# Patient Record
Sex: FEMALE | Race: OTHER
[De-identification: names, ages, dates, MRNs, and addresses within clinical notes are randomized per-mention and may not be internally consistent; named-entity substitution may affect disease eponyms.]

---

## 2021-05-17 ENCOUNTER — HOSPITAL ENCOUNTER (EMERGENCY)
Dept: HOSPITAL 61 - ER | Age: 41
Discharge: HOME | End: 2021-05-17
Payer: SELF-PAY

## 2021-05-17 VITALS — DIASTOLIC BLOOD PRESSURE: 79 MMHG | SYSTOLIC BLOOD PRESSURE: 126 MMHG

## 2021-05-17 VITALS — BODY MASS INDEX: 27.59 KG/M2 | WEIGHT: 149.91 LBS | HEIGHT: 62 IN

## 2021-05-17 DIAGNOSIS — E11.9: ICD-10-CM

## 2021-05-17 DIAGNOSIS — S61.213A: Primary | ICD-10-CM
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
## 2021-05-17 PROCEDURE — 99283 EMERGENCY DEPT VISIT LOW MDM: CPT

## 2021-05-17 NOTE — PHYS DOC
Past Medical History


Past Medical History:  Diabetes-Type II


 (ELAINA WATTS RYAN APRN)


Past Surgical History:  No Surgical History


 (ELAINA WATTS RYAN APRN)


Smoking Status:  Current Every Day Smoker


Alcohol Use:  None


 (ELAINA WATTS APRN)





General Adult


EDM:


Chief Complaint:  LACERATION/AVULSION





HPI:


HPI:





Patient is a 40  year old female presented to the ED today with left middle 

finger laceration that occurred yesterday.  Patient states her tetanus is 

up-to-date.  She states she cut herself accidentally with a knife.  Patient is 

right-handed.


 (ELAINA WATTS APRN)





Review of Systems:


Review of Systems:


Constitutional:   Denies fever or chills. []


Musculoskeletal:   Denies back pain or joint pain. [] 


Integument: Reports left middle finger laceration


Neurologic:   Denies headache, focal weakness or sensory changes. [] 


Psychiatric:  Denies depression or anxiety. []


 (ELAINA WATTS RYAN APRN)





Heart Score:


C/O Chest Pain:  N/A


Risk Factors:


Risk Factors:  DM, Current or recent (<one month) smoker, HTN, HLP, family 

history of CAD, obesity.


Risk Scores:


Score 0 - 3:  2.5% MACE over next 6 weeks - Discharge Home


Score 4 - 6:  20.3% MACE over next 6 weeks - Admit for Clinical Observation


Score 7 - 10:  72.7% MACE over next 6 weeks - Early Invasive Strategies


 (ELAINA WATTS RYAN APRN)





Allergies:


Allergies:





Allergies








Coded Allergies Type Severity Reaction Last Updated Verified


 


  Penicillins Allergy Unknown  5/17/21 Yes


 


  cephalexin Allergy Unknown  5/17/21 Yes








 (ELAINA WATTS RYAN APRN)





Physical Exam:


PE:





Constitutional: Well developed, well nourished, no acute distress, non-toxic 

appearance. []


Skin: Left middle finger radial aspect with a laceration approximately 3 cm long

on the distal end of the finger.  There is no obvious tendon involvement.  

Laceration appears old and has crusting around it.  Full range of motion to the 

left middle finger.  Adequate radial, median sensation to the left middle 

finger.  +2 left radial pulse.  Cap refill less than 2 seconds the left middle 

finger


Back: No tenderness, no CVA tenderness. [] 


Extremities: No tenderness, no cyanosis, no clubbing, ROM intact, no edema. [] 


Neurologic: Alert and oriented X 3, normal motor function, normal sensory 

function, no focal deficits noted. []


Psychologic: Affect normal, judgement normal, mood normal. []


 (ELAINA WATTS)





Current Patient Data:


Vital Signs:





                                   Vital Signs








  Date Time  Temp Pulse Resp B/P (MAP) Pulse Ox O2 Delivery O2 Flow Rate FiO2


 


5/17/21 17:26 98.2 97 12 126/79 (95) 99 Room Air  





 98.2       








 (ELAINA WATTS)





EKG:


EKG:


[]


 (ELAINA WATTS)





Radiology/Procedures:


Radiology/Procedures:


[]


 (ELAINA WATTS)





Course & Med Decision Making:


Course & Med Decision Making


Pertinent Labs and Imaging studies reviewed. (See chart for details)





This is a 40-year-old female patient presenting to the ED today with left middle

 finger laceration that occurred yesterday.  Laceration is already dried up and 

cannot be closed.  It will be allowed to heal by secondary intention.  Tetanus 

is up-to-date.  Discharged on Bactrim, wound care instructions provided








 (ELAINA WATTS)


Course & Med Decision Making


The patient was seen and interviewed as well as examined at the bedside. The 

chart was reviewed. The case was discussed. Agree with the plan of care.


 (HERSON CASTILLO DO)


Dragon Disclaimer:


Dragon Disclaimer:


This electronic medical record was generated, in whole or in part, using a voice

 recognition dictation system.


 (ELAINA WATTS)





Departure


Departure


Impression:  


   Primary Impression:  


   Finger laceration


   Qualified Codes:  S61.213A - Laceration without foreign body of left middle 

   finger without damage to nail, initial encounter


Disposition:  01 HOME / SELF CARE / HOMELESS


Condition:  STABLE


Referrals:  


NO PCP (PCP)


follow up with your doctor in 1-2 weeks


Patient Instructions:  Fingertip Laceration





Additional Instructions:  


You have laceration to left middle finger.  Wash the area with regular soap and 

water.  Apply Neosporin to the area twice a day.  Take the prescribed 

antibiotics until completed.  Monitor the area for any signs of infection 

including but not limited to increased redness, warmth, yellow drainage from the

 area and return to the ED if they occur


Scripts


Sulfamethoxazole/Trimethoprim (BACTRIM DS TABLET) 1 Each Tablet


1 TAB PO BID for 10 Days, #20 TAB 0 Refills


   Prov: ELAINA WATTS         5/17/21











ELAINA WATTS            May 17, 2021 18:28


HERSON CASTILLO DO            May 19, 2021 19:05

## 2021-10-01 ENCOUNTER — HOSPITAL ENCOUNTER (EMERGENCY)
Dept: HOSPITAL 61 - ER | Age: 41
Discharge: HOME | End: 2021-10-01
Payer: SELF-PAY

## 2021-10-01 VITALS — WEIGHT: 165.35 LBS | HEIGHT: 62 IN | BODY MASS INDEX: 30.43 KG/M2

## 2021-10-01 VITALS — DIASTOLIC BLOOD PRESSURE: 86 MMHG | SYSTOLIC BLOOD PRESSURE: 160 MMHG

## 2021-10-01 DIAGNOSIS — F17.200: ICD-10-CM
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
## 2021-10-01 DIAGNOSIS — Z88.0: ICD-10-CM

## 2021-10-01 DIAGNOSIS — S05.01XA: Primary | ICD-10-CM

## 2021-10-01 DIAGNOSIS — E11.9: ICD-10-CM

## 2021-10-01 DIAGNOSIS — Z88.1: ICD-10-CM

## 2021-10-01 PROCEDURE — 99283 EMERGENCY DEPT VISIT LOW MDM: CPT

## 2021-10-01 NOTE — PHYS DOC
Past Medical History


Past Medical History:  Diabetes-Type II


Past Surgical History:  No Surgical History


Smoking Status:  Current Every Day Smoker


Alcohol Use:  None





General Adult


EDM:


Chief Complaint:  EYE PROBLEMS





HPI:


HPI:





Patient is a 41  year old female who presents with right eye pain after hitting 

herself with the corner of her car door approximately 3 to 4 hours ago.  Eye has

been watering frequently.  She has a sensation like something is stuck in her 

eye.  She has noticed her nose running on the same side that her eye is 

watering.  Small amount of bruising around the eye.  Did not lose consciousness.

 No confusion.  Denies pain elsewhere.  Not a contact wearer.





Review of Systems:


Review of Systems:


Constitutional:   Denies fever or chills. []


Eyes:   Right eye pain.  No visual change.


HENT:   Denies nasal congestion or sore throat. [] 


Respiratory:   Denies cough or shortness of breath. [] 


Cardiovascular:   Denies chest pain or edema. [] 


GI:   Denies abdominal pain, nausea, vomiting, bloody stools or diarrhea. [] 


:  Denies dysuria. [] 


Musculoskeletal:   Denies back pain or joint pain. [] 


Integument:   Denies rash. [] 


Neurologic:   Denies headache, focal weakness or sensory changes. [] 


Endocrine:   Denies polyuria or polydipsia. [] 


Lymphatic:  Denies swollen glands. [] 


Psychiatric:  Denies depression or anxiety. []





Heart Score:


C/O Chest Pain:  No


Risk Factors:


Risk Factors:  DM, Current or recent (<one month) smoker, HTN, HLP, family 

history of CAD, obesity.


Risk Scores:


Score 0 - 3:  2.5% MACE over next 6 weeks - Discharge Home


Score 4 - 6:  20.3% MACE over next 6 weeks - Admit for Clinical Observation


Score 7 - 10:  72.7% MACE over next 6 weeks - Early Invasive Strategies





Current Medications:





Current Medications








 Medications


  (Trade)  Dose


 Ordered  Sig/Magan  Start Time


 Stop Time Status Last Admin


Dose Admin


 


 Fluorescein Sodium


  (Ful-Dinorah)  1 strip  1X  ONCE  10/1/21 05:00


 10/1/21 05:01  10/1/21 04:48


1 STRIP


 


 Tetracaine HCl


  (Tetracaine)  1 drop  1X  ONCE  10/1/21 05:00


 10/1/21 05:01  10/1/21 04:48


1 DROP











Allergies:


Allergies:





Allergies








Coded Allergies Type Severity Reaction Last Updated Verified


 


  Penicillins Allergy Unknown  5/17/21 Yes


 


  cephalexin Allergy Unknown  5/17/21 Yes











Physical Exam:


PE:





Constitutional: Well developed, well nourished, no acute distress, non-toxic 

appearance. []


HENT: Very slight bruising just below the right eye, no significant edema.  No 

frontal bone, zygomatic, or maxillary tenderness to palpation surrounding the 

eye.


Eyes: Visual acuity intact.  EOMs intact.  Conjunctive a injected.  Fluorescein 

exam shows broad-based corneal abrasion on the lower half of the cornea.  No 

Junaid sign.  IOP 27.


Neck: Normal range of motion, no tenderness, supple, no stridor. [] 


Skin: Warm, dry, no erythema, no rash. [] 


Extremities: No tenderness, no cyanosis, no clubbing, ROM intact, no edema. [] 


Neurologic: Alert and oriented X 3, normal motor function, normal sensory 

function, no focal deficits noted. []


Psychologic: Affect normal, judgement normal, mood normal. []





EKG:


EKG:


[]





Radiology/Procedures:


Radiology/Procedures:


[]





Course & Med Decision Making:


Course & Med Decision Making


Pertinent Labs and Imaging studies reviewed. (See chart for details)





Patient 41-year-old female presents with right eye pain after hitting herself in

 the eye with a car door.


No midface instability or tenderness to suggest facial bone fractures.IOP within

 normal limits.  Retrobulbar hematoma seems exceedingly unlikely.


Visual acuity grossly intact.


No evidence of globe rupture.


Pain resolved with tetracaine.  No further photophobia after tetracaine, doubt 

traumatic iritis.


Corneal abrasion seen on fluorescein exam.


Will prescribe erythromycin ointment.





Dragon Disclaimer:


Dragon Disclaimer:


This electronic medical record was generated, in whole or in part, using a voice

 recognition dictation system.





Departure


Departure


Impression:  


   Primary Impression:  


   Corneal abrasion


Disposition:  01 HOME / SELF CARE / HOMELESS


Condition:  STABLE


Referrals:  


NO PCP (PCP)


Patient Instructions:  Eye - Corneal Abrasion


Scripts


Erythromycin Base (Erythromycin) 1 Gm Oint...g.


1 GM OD QID for 5 Days, #1 MISC 1 Refill


   Prov: NORA TONEY MD         10/1/21











NORA TONEY MD               Oct 1, 2021 05:06